# Patient Record
Sex: FEMALE | Race: BLACK OR AFRICAN AMERICAN
[De-identification: names, ages, dates, MRNs, and addresses within clinical notes are randomized per-mention and may not be internally consistent; named-entity substitution may affect disease eponyms.]

---

## 2017-02-03 ENCOUNTER — HOSPITAL ENCOUNTER (OUTPATIENT)
Dept: HOSPITAL 68 - STS | Age: 47
End: 2017-02-03
Attending: SURGERY
Payer: COMMERCIAL

## 2017-02-03 VITALS — BODY MASS INDEX: 46.13 KG/M2 | HEIGHT: 60 IN | WEIGHT: 235 LBS

## 2017-02-03 DIAGNOSIS — L30.4: Primary | ICD-10-CM

## 2017-02-03 DIAGNOSIS — Z98.84: ICD-10-CM

## 2017-02-03 DIAGNOSIS — E65: ICD-10-CM

## 2017-02-03 NOTE — OPERATIVE REPORT
Operative/Inv Procedure Report
Surgery Date: 02/03/17
Name of Procedure:
Extended panniculectomy
Pre-Operative Diagnosis:
Excessive abdominal pannus chronic intertrigo
Post-Operative Diagnosis:
Same
Estimated Blood Loss: scant (200)
Surgeon/Assistant:
SABINO BENTLEY MD
 
Anesthesia: general endotracheal tube
 
Operative/Procedure Note
Note:
The patient was counseled extensively in regards to the procedure the 
alternatives the risks and expected outcomes as relates to her request for 
surgical intervention for chronic intertrigo.  Due to the patient's body habitus
and her desires she is requesting removal of the umbilicus.  The umbilicus will 
need to be removed on today's preoperative evaluation since it hangs below the 
level of the normal panniculectomy incision will also allow me to treat the 
symptoms more fully.  We talked about the risks including infection bleeding 
open wound pain numbness's of skin injury to surrounding and deeper structures 
and a visible permanent scarring possibly unsightly or symptomatic.  In addition
she was given a SPS informed consent we discussed further today.  She was marked
in the standing position with a measuring tape for symmetry.  In the areas that 
would be treated those that would not.  She was then taken to the operating room
placed supine on table.  Venodyne boots are placed and then general anesthesia 
was established intravenous antibiotics were given.  The abdomen was prepped and
draped in usual sterile fashion.  The incisions were deepened and the pannus was
removed along the abdominal wall fascia. 
 
There was significant extra time required due to the patient's body habitus.  We
just needed to be divided and the empty stalk was left intact.  The vasculature 
was excessive with vessels exceeding three-eighths of an inch which needs to be 
tied additionally.  Hip elation of the pannus which was significant laid 
operative time as well.  The length of her incision and the closure was 
excessive and bot beyond the usual and customary panniculectomy.
 
The incision was closed in 4 layers over 2 drains.

## 2018-05-03 ENCOUNTER — HOSPITAL ENCOUNTER (OUTPATIENT)
Dept: HOSPITAL 68 - STS | Age: 48
End: 2018-05-03
Attending: SURGERY
Payer: COMMERCIAL

## 2018-05-03 VITALS — HEIGHT: 60 IN | WEIGHT: 210 LBS | BODY MASS INDEX: 41.23 KG/M2

## 2018-05-03 DIAGNOSIS — N62: Primary | ICD-10-CM

## 2018-05-03 DIAGNOSIS — E06.3: ICD-10-CM

## 2018-05-03 DIAGNOSIS — M54.9: ICD-10-CM

## 2018-05-03 DIAGNOSIS — Z79.84: ICD-10-CM

## 2018-05-03 DIAGNOSIS — E11.9: ICD-10-CM

## 2018-05-03 DIAGNOSIS — M25.519: ICD-10-CM

## 2018-05-03 DIAGNOSIS — M62.830: ICD-10-CM

## 2018-05-03 NOTE — OPERATIVE REPORT
Operative/Inv Procedure Report
Surgery Date: 05/03/18
Name of Procedure:
Bilateral breast reduction free nipple graftexcessive
Pre-Operative Diagnosis:
Symptomatically macromastia
Post-Operative Diagnosis:
Same
Estimated Blood Loss: scant (400)
Surgeon/Assistant:
Mark Staley MD
 
Anesthesia: general endotracheal tube
 
Operative/Procedure Note
Note:
Patient was counseled extensively regards to the procedure the alternatives 
risks and expected outcomes as relates to request for surgical intervention to 
treat symptomatic bilateral macromastia.  Patient is 52 cm to the nipple.  We 
talked about the free nipple graft and she has accepted this including failure 
to breast-feed failure have sexual sensation and stimulus of the nipple, 
erectile function of the nipple and he'll be flat in appearance.  We also talked
about the risks of partial complete loss of the nipple areolar graft in addition
to skin of the breast cells.  She signed a SPS informed consent has no questions
regarding it today.  She was marked in the standing position for a Wise pattern 
skin excision.  She signed informed consent after additional discussion today of
her acceptance of the graft.  She was given options of not having it performed 
today.  She will brought to the operating placed supine on the table Venodyne 
boots are placed and then general anesthesia was established antibiotics were 
given.  The chest was prepped and draped in usual sterile fashion.  A inverted V
segment between the vertical limbs was de-epithelialized as well as an inferior 
soft tissue pedicle.  Resection was then carried out after removing the nipple 
areolar complex.  Deep 2-0 Vicryl used to tack in the goals to improve shape and
projection.  Temporary closure was carried out and the patient was put in the 
sitting position to assess the location of the nipple areola complexes.  3 layer
closure was then carried out of all wounds.